# Patient Record
Sex: FEMALE | URBAN - METROPOLITAN AREA
[De-identification: names, ages, dates, MRNs, and addresses within clinical notes are randomized per-mention and may not be internally consistent; named-entity substitution may affect disease eponyms.]

---

## 2017-05-24 ENCOUNTER — RECORDS - HEALTHEAST (OUTPATIENT)
Dept: GENERAL RADIOLOGY | Facility: CLINIC | Age: 32
End: 2017-05-24

## 2017-05-24 ENCOUNTER — OFFICE VISIT - HEALTHEAST (OUTPATIENT)
Dept: FAMILY MEDICINE | Facility: CLINIC | Age: 32
End: 2017-05-24

## 2017-05-24 DIAGNOSIS — R10.30 ABDOMINAL PAIN, LOWER: ICD-10-CM

## 2017-05-24 DIAGNOSIS — R10.30 LOWER ABDOMINAL PAIN, UNSPECIFIED: ICD-10-CM

## 2017-06-06 ENCOUNTER — OFFICE VISIT - HEALTHEAST (OUTPATIENT)
Dept: FAMILY MEDICINE | Facility: CLINIC | Age: 32
End: 2017-06-06

## 2017-06-06 DIAGNOSIS — S29.011A MUSCLE STRAIN OF CHEST WALL, INITIAL ENCOUNTER: ICD-10-CM

## 2017-06-06 ASSESSMENT — MIFFLIN-ST. JEOR: SCORE: 1528.93

## 2017-10-15 ENCOUNTER — HEALTH MAINTENANCE LETTER (OUTPATIENT)
Age: 32
End: 2017-10-15

## 2018-08-21 ENCOUNTER — COMMUNICATION - HEALTHEAST (OUTPATIENT)
Dept: ADMINISTRATIVE | Facility: CLINIC | Age: 33
End: 2018-08-21

## 2019-08-12 ENCOUNTER — COMMUNICATION - HEALTHEAST (OUTPATIENT)
Dept: HEALTH INFORMATION MANAGEMENT | Facility: CLINIC | Age: 34
End: 2019-08-12

## 2020-02-24 ENCOUNTER — HEALTH MAINTENANCE LETTER (OUTPATIENT)
Age: 35
End: 2020-02-24

## 2020-12-13 ENCOUNTER — HEALTH MAINTENANCE LETTER (OUTPATIENT)
Age: 35
End: 2020-12-13

## 2021-04-17 ENCOUNTER — HEALTH MAINTENANCE LETTER (OUTPATIENT)
Age: 36
End: 2021-04-17

## 2021-05-31 VITALS — WEIGHT: 191 LBS | BODY MASS INDEX: 35.5 KG/M2

## 2021-05-31 VITALS — WEIGHT: 193.7 LBS | BODY MASS INDEX: 35.64 KG/M2 | HEIGHT: 62 IN

## 2021-06-01 ENCOUNTER — RECORDS - HEALTHEAST (OUTPATIENT)
Dept: ADMINISTRATIVE | Facility: CLINIC | Age: 36
End: 2021-06-01

## 2021-06-10 NOTE — PROGRESS NOTES
ASSESSMENT:  1. Abdominal pain, lower  - XR Abdomen Flat and Upright; Future  - Urinalysis-UC if Indicated      PLAN:  There are no Patient Instructions on file for this visit.    Orders Placed This Encounter   Procedures     XR Abdomen Flat and Upright     Standing Status:   Future     Number of Occurrences:   1     Standing Expiration Date:   5/24/2018     Order Specific Question:   Reason for Exam (Describe Symptoms):     Answer:   Abdominal Pain.     Order Specific Question:   Is the patient pregnant?     Answer:   No     Order Specific Question:   Can the procedure be changed per Radiologist protocol?     Answer:   Yes     Urinalysis-UC if Indicated     Medications Discontinued During This Encounter   Medication Reason     ondansetron (ZOFRAN, AS HYDROCHLORIDE,) 4 MG tablet Therapy completed     cyclobenzaprine (FLEXERIL) 10 MG tablet Therapy completed     HYDROcodone-acetaminophen 5-325 mg per tablet Therapy completed       No Follow-up on file.   Ordered and read Abdominal X-ray. The UA from today was negative. Advised patient to continue to take Miralax daily. Advised patient to increase water intake. Also advised patient to use Senna or Senna S followed by milk of magnesia or magnesium citrate an hour later. Patient can hold off of the colon cleanse for now.     CHIEF COMPLAINT:  Chief Complaint   Patient presents with     Constipation     abdominal pain, had BM today, has hemorrhoids, using Miralax and colon cleanse       HISTORY OF PRESENT ILLNESS:  Herminia is a 31 y.o. female presenting to the clinic today with her son for evaluation of constipation. She as had constipation since last week. She usually has normal bowel movements in the morning, but she has not since the constipation started. She has been using Miralax and a colon cleanse every day, but it has not been helpful. In the past, she used to use a colon cleanse with constipation improvement. She feels as if she has to have a bowel movement, but  she is not able to. She then tries a suppository, but she only passes a little stool. She has had some blood in her stool as well due to hemorrhoids. She has been having a lot of bilateral lower abdominal pain on both sides for 3 or 4 days. The abdominal pain feels like cramps. She feels bloated and passes gas. The abdominal pain does not radiate. She has had more frequent urination. She denies nausea and dysuria. She has an appointment scheduled with the colon and rectal specialist, and she says that they do not do X-rays.    REVIEW OF SYSTEMS:   She does not have cough or difficulty breathing. All other systems are negative.    PFSH:  Reviewed, as below.     TOBACCO USE:  History   Smoking Status     Never Smoker   Smokeless Tobacco     Never Used       VITALS:  Vitals:    05/24/17 1318   BP: 90/64   Pulse: 76   Weight: 191 lb (86.6 kg)     Wt Readings from Last 3 Encounters:   05/24/17 191 lb (86.6 kg)   12/09/15 199 lb 11.2 oz (90.6 kg)   12/07/15 195 lb 4 oz (88.6 kg)     Body mass index is 35.5 kg/(m^2).    PHYSICAL EXAM:  General Appearance: Alert, cooperative, no distress, appears stated age  HEENT: Pupils equal, symmetric  Lungs: Clear to auscultation bilaterally, respirations unlabored  Back: No renal angle tenderness.   Heart: Regular rate and rhythm, S1 and S2 normal, no murmur, rub, or gallop  Abdomen: Soft, bowel sounds active all four quadrants, no masses, no organomegaly. Discomfort to palpation to both flanks and discomfort to suprapubic area on both sides of the lower abdomen.   Neurologic:  Alert and oriented times 3. Normal reflexes. Cranial nerves II-XII intact.   Psychiatric: Normal mood and affect.    X-ray, Abdominal: Moderate amount of feces on both ascending and descending colon.      ADDITIONAL HISTORY SUMMARIZED (2): None.  DECISION TO OBTAIN EXTRA INFORMATION (1): None.   RADIOLOGY TESTS (1): Ordered Abdominal X-ray.   LABS (1): Ordered lab.   MEDICINE TESTS (1): None.  INDEPENDENT  REVIEW (2 each): Reviewed Abdominal X-ray, as above.       The visit lasted a total of 17 minutes face to face with the patient. Over 50% of the time was spent counseling and educating the patient about constipation.    IReggie, am scribing for and in the presence of, Dr. Daley.    I, Dr. Daley, personally performed the services described in this documentation, as scribed by Reggie Silva in my presence, and it is both accurate and complete.    MEDICATIONS:  Current Outpatient Prescriptions   Medication Sig Dispense Refill     ORTHO TRI-CYCLEN LO, 28, 0.18/0.215 mg/ 0.25 mg-25 mcg Tab tablet TAKE ONE TABLET BY MOUTH DAILY 28 tablet 11     benzocaine (HURRICAINE) 20 % SprA Apply 2 sprays to the mouth or throat 4 (four) times a day as needed. 57 mL 0     ibuprofen (ADVIL,MOTRIN) 800 MG tablet TAKE 1 TABLET BY MOUTH EVERY 8 HOURS AS NEEDED FOR PAIN 90 tablet 0     phenol (CHLORASEPTIC) 0.5 % SprA 5 sprays PO q 2 hr prn 177 mL 0     No current facility-administered medications for this visit.        Total data points: 4

## 2021-06-11 NOTE — PROGRESS NOTES
Assessment:   1. Muscle strain of chest wall, initial encounter  Informed patient that her symptom is benign and not life threatening. Her pain possible from her weight training which causes muscle to slightly tear leading to pain which can last anywhere from 24 hours to few days.   - cyclobenzaprine (FLEXERIL) 5 MG tablet; Take 2 tablets (10 mg total) by mouth every 8 (eight) hours as needed for muscle spasms.  Dispense: 30 tablet; Refill: 1  - ibuprofen (ADVIL,MOTRIN) 800 MG tablet; Take 1 tablet (800 mg total) by mouth every 8 (eight) hours as needed for pain.  Dispense: 90 tablet; Refill: 1    Plan:   Natural history and expected course discussed. Questions answered.  Reduction in offending activity.  Gentle ROM exercises.  Rest, ice, compression, and elevation (RICE) therapy.  NSAIDs per medication orders.  Follow up in 7 days.if symptom persist     Subjective:   Herminia Kelley is a 31 y.o. female who presents with bilateral shoulder chest wall pain. The symptoms began 2 days ago. Aggravating factors: Patient reports that she went to the gym on Sunday after taking a break for few months and she did a lot of weight exercise. The next day started to have pain on her upper body. She went back yesterday and lifted more weight. Pain is located bilateral shoulder, upper arms and chest wall. Discomfort is described as aching. Symptoms are exacerbated by repetitive movements. Evaluation to date: none. Therapy to date includes: OTC analgesics which are somewhat effective.    The following portions of the patient's history were reviewed and updated as appropriate:   She  has a past medical history of Constipation and Lumbar Strain.  She  does not have any pertinent problems on file.  She  has a past surgical history that includes Appendectomy and Cholecystectomy.  She  reports that she has never smoked. She has never used smokeless tobacco. She reports that she does not drink alcohol or use illicit drugs.  Current  "Outpatient Prescriptions on File Prior to Visit   Medication Sig     [DISCONTINUED] ibuprofen (ADVIL,MOTRIN) 800 MG tablet TAKE 1 TABLET BY MOUTH EVERY 8 HOURS AS NEEDED FOR PAIN     benzocaine (HURRICAINE) 20 % SprA Apply 2 sprays to the mouth or throat 4 (four) times a day as needed.     ORTHO TRI-CYCLEN LO, 28, 0.18/0.215 mg/ 0.25 mg-25 mcg Tab tablet TAKE ONE TABLET BY MOUTH DAILY     phenol (CHLORASEPTIC) 0.5 % SprA 5 sprays PO q 2 hr prn     No current facility-administered medications on file prior to visit.      She has No Known Allergies..    Review of Systems  A 12 point comprehensive review of systems was negative except as noted.     Objective:      BP 90/62  Pulse 76  Ht 5' 1.5\" (1.562 m)  Wt 193 lb 11.2 oz (87.9 kg)  LMP 06/06/2017  SpO2 98%  Breastfeeding? No  BMI 36.01 kg/m2  Right shoulder: non-specific diffuse tenderness about the shoulder, sensory exam normal, motor exam normal and radial pulse intact   Left shoulder: non-specific diffuse tenderness about the shoulder, full ROM, sensory exam normal, motor exam normal and radial pulse intact   Chest wall tenderness with palpation.  "

## 2021-06-19 NOTE — LETTER
Letter by Supa Jarvis at      Author: Supa Jarvis Service: -- Author Type: --    Filed:  Encounter Date: 2019 Status: (Other)         2019       Herminia Kelley  [address] [place] [id number]    Dear Herminia Kelley:    We are pleased to provide you with secure, online access to medical information for you and your family. Per your request, we have expanded your account to allow access to the records of the following family members:              Abhishek Barbozajennifer Dueñas (privilege ends on 2020.)            Arsalan SUE Barbozal (privilege ends on 2022.)            BasimOwen Barbozal (privilege ends on 2024.)            Braulio CARRILLO Lau (privilege ends on 2021.)     How Do I Login?  1. In your Internet browser, go to [email or web address].  2. Click on Sign Up Now   3. Enter your [employer] Activation Code exactly as it appears below. This code will  60 days after it is generated. You will not need to use this code after you have completed the sign-up process. If you do not sign up before the expiration date, you must request a new code.     [employer] Activation Code: [email or web address]  Expires: 2019  5:29 AM    4. Enter in your date of birth and zip code.  5. Create a [employer] username. Think of one that is secure and easy to remember.  Your username must be between 6 and 20 characters.  6. Create a [employer] password. You can change your password at any time. Your password must be between 8 and 45 characters, contain at least two letters and one number, and contain both upper and lower case letters.  7. Choose a security question, enter your answer, and click Next. This can be used to access [employer] if you forget your password.   8. Enter a valid e-mail address to receive e-mail notifications when new information is available in [employer].  9. Click Sign In.        How Do I Access a Family Member's Account?  10. Select the account you want to access by clicking  the [place] with the appropriate patient's name at the top of your screen.   11. You will see a disclaimer page letting you know that you will be viewing a family member's record. Review the disclaimer and then click Accept Proxy Access Disclaimer to proceed.  12. Once you switch to viewing a family member's record, you can navigate to [employer] pages the same way you would for yourself. You can return to your own account by clicking the [place] at the top of the screen with your name on it.    13. To customize colors and names of the linked accounts, you can select Personalize from the Profile dropdown menu at the top of the screen, then click the Edit button to make changes.      Additional Information  If you have questions, you can e-mail [email or web address] or call [phone] to talk to our [facility] [employer] staff. Remember, [employer] is NOT to be used for urgent needs. For medical emergencies, dial 911.

## 2021-06-19 NOTE — LETTER
Letter by Supa Jarvis at      Author: Supa Jarvis Service: -- Author Type: --    Filed:  Encounter Date: 2019 Status: (Other)         2019       Herminia Kelley  [address] [place] [id number]    Dear Herminia Kelley:    We are pleased to provide you with secure, online access to medical information for you and your family. Per your request, we have expanded your account to allow access to the records of the following family members:              Abhishek Lau Jr. (privilege ends on 2020.)     How Do I Login?  1. In your Internet browser, go to [email or web address].  2. Click on Sign Up Now   3. Enter your [email or web address] Activation Code exactly as it appears below. This code will  60 days after it is generated. You will not need to use this code after you have completed the sign-up process. If you do not sign up before the expiration date, you must request a new code.     [email or web address] Activation Code: [email or web address]  Expires: 2019  5:29 AM    4. Enter in your date of birth and zip code.  5. Create a [email or web address] username. Think of one that is secure and easy to remember.  Your username must be between 6 and 20 characters.  6. Create a [email or web address] password. You can change your password at any time. Your password must be between 8 and 45 characters, contain at least two letters and one number, and contain both upper and lower case letters.  7. Choose a security question, enter your answer, and click Next. This can be used to access [email or web address] if you forget your password.   8. Enter a valid e-mail address to receive e-mail notifications when new information is available in [email or web address].  9. Click Sign In.        How Do I Access a Family Member's Account?  10. Select the account you want to access by clicking the [place] with the appropriate patient's name at the top of your screen.   11. You will see a disclaimer page letting you know that you will be viewing a family member's record. Review  the disclaimer and then click Accept Proxy Access Disclaimer to proceed.  12. Once you switch to viewing a family member's record, you can navigate to [employer] pages the same way you would for yourself. You can return to your own account by clicking the [place] at the top of the screen with your name on it.    13. To customize colors and names of the linked accounts, you can select Personalize from the Profile dropdown menu at the top of the screen, then click the Edit button to make changes.      Additional Information  If you have questions, you can e-mail [email or web address] or call [phone] to talk to our [facility] [employer] staff. Remember, [employer] is NOT to be used for urgent needs. For medical emergencies, dial 911.

## 2021-09-26 ENCOUNTER — HEALTH MAINTENANCE LETTER (OUTPATIENT)
Age: 36
End: 2021-09-26

## 2022-05-08 ENCOUNTER — HEALTH MAINTENANCE LETTER (OUTPATIENT)
Age: 37
End: 2022-05-08

## 2023-01-14 ENCOUNTER — HEALTH MAINTENANCE LETTER (OUTPATIENT)
Age: 38
End: 2023-01-14

## 2023-06-19 ENCOUNTER — TELEPHONE (OUTPATIENT)
Dept: PEDIATRICS | Facility: CLINIC | Age: 38
End: 2023-06-19

## 2023-06-19 NOTE — TELEPHONE ENCOUNTER
Pt calling to see if sunglasses were found at clinic while she was there for sons (MRN [id number]) appt with Dr. Vargas.    [employer]unglasses; jonah/plum in [place].    Please call her back at [phone]

## 2023-12-03 ENCOUNTER — HEALTH MAINTENANCE LETTER (OUTPATIENT)
Age: 38
End: 2023-12-03